# Patient Record
Sex: FEMALE | Race: BLACK OR AFRICAN AMERICAN | NOT HISPANIC OR LATINO | URBAN - METROPOLITAN AREA
[De-identification: names, ages, dates, MRNs, and addresses within clinical notes are randomized per-mention and may not be internally consistent; named-entity substitution may affect disease eponyms.]

---

## 2020-08-16 ENCOUNTER — EMERGENCY (EMERGENCY)
Facility: HOSPITAL | Age: 50
LOS: 1 days | Discharge: HOME | End: 2020-08-16
Attending: STUDENT IN AN ORGANIZED HEALTH CARE EDUCATION/TRAINING PROGRAM
Payer: COMMERCIAL

## 2020-08-16 VITALS
OXYGEN SATURATION: 97 % | DIASTOLIC BLOOD PRESSURE: 85 MMHG | HEART RATE: 67 BPM | TEMPERATURE: 99 F | HEIGHT: 61 IN | WEIGHT: 149.03 LBS | RESPIRATION RATE: 18 BRPM | SYSTOLIC BLOOD PRESSURE: 137 MMHG

## 2020-08-16 PROCEDURE — 99282 EMERGENCY DEPT VISIT SF MDM: CPT | Mod: 25

## 2020-08-16 PROCEDURE — 12001 RPR S/N/AX/GEN/TRNK 2.5CM/<: CPT

## 2020-08-16 PROCEDURE — 99283 EMERGENCY DEPT VISIT LOW MDM: CPT | Mod: 25

## 2020-08-16 PROCEDURE — 12001 RPR S/N/AX/GEN/TRNK 2.5CM/<: CPT | Mod: F4

## 2020-08-16 NOTE — ED PROVIDER NOTE - PATIENT PORTAL LINK FT
You can access the FollowMyHealth Patient Portal offered by [facility] by registering at the following website: [email or web address]. By joining [employer]’s FollowMyHealth portal, you will also be able to view your health information using other applications (apps) compatible with our system.

## 2020-08-16 NOTE — ED PROVIDER NOTE - PHYSICAL EXAMINATION
Vital signs noted, see flowsheet.  General: Well nourished/developed. In no acute distress, well appearing and non-toxic.  HEENT: Moist mucous membranes.   Cardiac: Regular rate and rhythm. +S1/S2. Peripheral pulses 2+ and symmetric b/l.  Respiratory: Speaking in full sentences, no evidence of respiratory distress. Lungs clear to ascultation b/l  Skin: 0.5 cm avulsion to left 5th distal pinky with small part of nail missing, no nailbed laceration, minimally oozing blood.   LEFT UE: Non tender to palpation, no swelling, sensation intact, + FROM, peripheral pulses 2+ b/l. Capillary refill less than 2 seconds.   Neuro: Awake, alert and oriented to person/place/time/situation. Moves all extremities spontaneously and symmetrically.

## 2020-08-16 NOTE — ED PROCEDURE NOTE - CPROC ED LACER REPAIR DETAIL1
No foreign body/SKIN AVULSION to left 5th pinky, minimal ooze of blood, controlled with pressure, hemostasis achieved/The wound was explored to base in bloodless field.

## 2020-08-16 NOTE — ED PROVIDER NOTE - OBJECTIVE STATEMENT
50 y/o F with PMHx asthma presents to ED c/o accidental laceration occurring today. Pt reports she was peeling potatoes when she accidentally cut her left pinky finger with ameya. Pt covered wound with tissue. Pt is right handed. No other injuries. Denies use of blood thinners. Last known TDAP less than 10 years ago

## 2020-08-16 NOTE — ED PROVIDER NOTE - CLINICAL SUMMARY MEDICAL DECISION MAKING FREE TEXT BOX
50 y/o F with PMHx asthma presents to ED c/o accidental laceration occurring today. On exam has left 5th digit distal skin avulsion with some ooze of blood. UTD TDAP  -Will apply pressure to control bleeding, repair

## 2020-08-16 NOTE — ED PROVIDER NOTE - NSFOLLOWUPINSTRUCTIONS_ED_ALL_ED_FT
- Please follow up with your Primary Care Doctor in 24-48 hr.  - Seek immediate medical care for any new, worsening or concerning signs or symptoms.     Feel better!   Laceration    A laceration is a cut that goes through all of the layers of the skin and into the tissue that is right under the skin. Some lacerations heal on their own. Others need to be closed with skin adhesive strips, skin glue, stitches (sutures), or staples. Proper laceration care minimizes the risk of infection and helps the laceration to heal better.  If non-absorbable stitches or staples have been placed, they must be taken out within the time frame instructed by your healthcare provider.    SEEK IMMEDIATE MEDICAL CARE IF YOU HAVE ANY OF THE FOLLOWING SYMPTOMS: swelling around the wound, worsening pain, drainage from the wound, red streaking going away from your wound, inability to move finger or toe near the laceration, or discoloration of skin near the laceration.

## 2020-08-16 NOTE — ED PROVIDER NOTE - CHPI ED SYMPTOMS NEG
no chills/no red streaks/no fever/no purulent drainage/no drainage/no redness/no vomiting/no numbness, no weakness

## 2020-08-16 NOTE — ED PROVIDER NOTE - PROGRESS NOTE DETAILS
PAULINA Doe NOTE: Hemostasis achieved, wound covered with tissue adhesive. Return for worsening sxs.  Pt stable for d/c, reports improvement, VSS, tolerating PO, ambulatory.  Discussion includes results, plan, and return precautions. Pt advised to f/u with PMD 1-2 days. Pt verbalized understanding/agreement of plan.

## 2023-12-19 ENCOUNTER — EMERGENCY (EMERGENCY)
Facility: HOSPITAL | Age: 53
LOS: 1 days | Discharge: HOME | End: 2023-12-19
Attending: EMERGENCY MEDICINE
Payer: COMMERCIAL

## 2023-12-19 VITALS
OXYGEN SATURATION: 97 % | TEMPERATURE: 99 F | HEIGHT: 61 IN | WEIGHT: 149.91 LBS | HEART RATE: 61 BPM | RESPIRATION RATE: 18 BRPM | DIASTOLIC BLOOD PRESSURE: 84 MMHG | SYSTOLIC BLOOD PRESSURE: 156 MMHG

## 2023-12-19 PROBLEM — J45.909 UNSPECIFIED ASTHMA, UNCOMPLICATED: Chronic | Status: ACTIVE | Noted: 2020-08-16

## 2023-12-19 PROCEDURE — 70450 CT HEAD/BRAIN W/O DYE: CPT | Mod: 26,MA

## 2023-12-19 PROCEDURE — 99284 EMERGENCY DEPT VISIT MOD MDM: CPT | Mod: 25

## 2023-12-19 PROCEDURE — 90715 TDAP VACCINE 7 YRS/> IM: CPT

## 2023-12-19 PROCEDURE — 72125 CT NECK SPINE W/O DYE: CPT | Mod: MA

## 2023-12-19 PROCEDURE — 90471 IMMUNIZATION ADMIN: CPT

## 2023-12-19 PROCEDURE — 12001 RPR S/N/AX/GEN/TRNK 2.5CM/<: CPT

## 2023-12-19 PROCEDURE — 70450 CT HEAD/BRAIN W/O DYE: CPT | Mod: MA

## 2023-12-19 PROCEDURE — 72125 CT NECK SPINE W/O DYE: CPT | Mod: 26,MA

## 2023-12-19 RX ORDER — TETANUS TOXOID, REDUCED DIPHTHERIA TOXOID AND ACELLULAR PERTUSSIS VACCINE, ADSORBED 5; 2.5; 8; 8; 2.5 [IU]/.5ML; [IU]/.5ML; UG/.5ML; UG/.5ML; UG/.5ML
0.5 SUSPENSION INTRAMUSCULAR ONCE
Refills: 0 | Status: COMPLETED | OUTPATIENT
Start: 2023-12-19 | End: 2023-12-19

## 2023-12-19 RX ORDER — ACETAMINOPHEN 500 MG
650 TABLET ORAL ONCE
Refills: 0 | Status: COMPLETED | OUTPATIENT
Start: 2023-12-19 | End: 2023-12-19

## 2023-12-19 RX ADMIN — Medication 650 MILLIGRAM(S): at 03:20

## 2023-12-19 RX ADMIN — TETANUS TOXOID, REDUCED DIPHTHERIA TOXOID AND ACELLULAR PERTUSSIS VACCINE, ADSORBED 0.5 MILLILITER(S): 5; 2.5; 8; 8; 2.5 SUSPENSION INTRAMUSCULAR at 03:21

## 2023-12-19 RX ADMIN — Medication 650 MILLIGRAM(S): at 01:24

## 2023-12-19 NOTE — ED PROVIDER NOTE - NSFOLLOWUPCLINICS_GEN_ALL_ED_FT
[facility]  Internal Medicine  [address] [place] [address]  Phone: [phone]  Fax: [phone]     [facility]  Internal Medicine  [address] [place] [address]  Phone: [phone]  Fax: [phone]

## 2023-12-19 NOTE — ED PROVIDER NOTE - CLINICAL SUMMARY MEDICAL DECISION MAKING FREE TEXT BOX
CT HEAD:  No intracranial bleed, mass effect or depressed skull fracture.  CT CERVICAL SPINE: No acute fracture or acute subluxation.    Pt is well appearing, has no new complaints and able to walk with normal gait. Pt is stable for discharge and follow up with medical doctor. Pt educated on care and need for follow up. Discussed anticipatory guidance and return precautions. Questions answered. I had a detailed discussion with the patient regarding the historical points, exam findings, and any diagnostic results supporting the discharge diagnosis. CT HEAD reported:  No intracranial bleed, mass effect or depressed skull fracture.  CT CERVICAL SPINE reported: No acute fracture or acute subluxation.    Pt is well appearing, has no new complaints and able to walk with normal gait. Pt is stable for discharge and follow up with medical doctor. Pt educated on care and need for follow up. Discussed anticipatory guidance and return precautions. Questions answered. I had a detailed discussion with the patient regarding the historical points, exam findings, and any diagnostic results supporting the discharge diagnosis.

## 2023-12-19 NOTE — ED ADULT NURSE NOTE - OBJECTIVE STATEMENT
Covering RN Tammy- Pt c/o headache, s/p fall off step stool, hitting back head, no LOC. Laceration noted to back of head. No acute distress noted.

## 2023-12-19 NOTE — ED PROVIDER NOTE - NSFOLLOWUPINSTRUCTIONS_ED_ALL_ED_FT
Staple removal in 1 week.    Take Tylenol 650mg every 6 hours for pain,    A head injury can include your scalp, face, skull, or brain and range from mild to severe. Effects can appear immediately after the injury or develop later. The effects may last a short time or be permanent. Healthcare providers may want to check your recovery over time. Treatment may change as you recover or develop new health problems from the head injury.    DISCHARGE INSTRUCTIONS:    Call your local emergency number (911 in the [place]), or have someone else call if:    You cannot be woken.    You have a seizure.    You stop responding to others or you faint.    You have blurry or double vision.    Your speech becomes slurred or confused.    You have arm or leg weakness, loss of feeling, or new problems with coordination.    Your pupils are larger than usual, or one pupil is a different size than the other.    You have blood or clear fluid coming out of your ears or nose.  Return to the emergency department if:    You have repeated or forceful vomiting.    You feel confused.    Your headache gets worse or becomes severe.    You or someone caring for you notices that you are harder to wake than usual.  Call your doctor if:    Your symptoms last longer than 6 weeks after the injury.    You have questions or concerns about your condition or care.  Medicines:    Acetaminophen decreases pain and fever. It is available without a doctor's order. Ask how much to take and how often to take it. Follow directions. Read the labels of all other medicines you are using to see if they also contain acetaminophen, or ask your doctor or pharmacist. Acetaminophen can cause liver damage if not taken correctly.    Take your medicine as directed. Contact your healthcare provider if you think your medicine is not helping or if you have side effects. Tell your provider if you are allergic to any medicine. Keep a list of the medicines, vitamins, and herbs you take. Include the amounts, and when and why you take them. Bring the list or the pill bottles to follow-up visits. Carry your medicine list with you in case of an emergency.  Self-care:    Rest or do quiet activities. Limit your time watching TV, using the computer, or doing tasks that require a lot of thinking. Slowly return to your normal activities as directed. Do not play sports or do activities that may cause you to get hit in the head. Ask your healthcare provider when you can return to sports.    Apply ice on your head for 15 to 20 minutes every hour or as directed. Use an ice pack, or put crushed ice in a plastic bag. Cover it with a towel before you apply it to your skin. Ice helps prevent tissue damage and decreases swelling and pain.    Have someone stay with you for 24 hours , or as directed. This person can monitor you for problems and call for help if needed. When you are awake, the person should ask you a few questions every few hours to see if you are thinking clearly. An example is to ask your name or address.  Prevent another head injury:    Wear a helmet that fits properly. Do this when you play sports, or ride a bike, scooter, or skateboard. Helmets help decrease your risk for a serious head injury. Talk to your healthcare provider about other ways you can protect yourself if you play sports.    Wear your seatbelt every time you are in a car. This helps lower your risk for a head injury if you are in a car accident.  Follow up with your doctor as directed: Write down your questions so you remember to ask them during your visits.

## 2023-12-19 NOTE — ED PROVIDER NOTE - PATIENT PORTAL LINK FT
You can access the FollowMyHealth Patient Portal offered by [facility] by registering at the following website: [email or web address]. By joining [employer]’s FollowMyHealth portal, you will also be able to view your health information using other applications (apps) compatible with our system. You can access the FollowMyHealth Patient Portal offered by [facility] by registering at the following website: [email or web address]. By joining [employer]’s FollowMyHealth portal, you will also be able to view your health information using other applications (apps) compatible with our system.

## 2023-12-19 NOTE — ED PROVIDER NOTE - PHYSICAL EXAMINATION
GCS 15, no raccoon eyes, no Battles sign, no scalp step off deformities.   1 cm vertical laceration to right occipital area.  No cervical, thoracic or lumbosacral midline bony deformities,  +rotation and flexion-extension of neck and truncal area intact.

## 2023-12-19 NOTE — ED PROVIDER NOTE - OBJECTIVE STATEMENT
Patient was standing on stool to reach object on high shelf and fell backwards struck back of head on pillar wall.  No loss of consciousness reported patient denies being on anticoagulants.  Patient initially seen at local urgent care and referred to ED for further evaluation.  Patient sustained 1 cm laceration to right occipital area.

## 2023-12-19 NOTE — ED ADULT NURSE NOTE - NSFALLRISKINTERV_ED_ALL_ED
Communicate fall risk and risk factors to all staff, patient, and family/Provide visual cue: yellow wristband, yellow gown, etc/Reinforce activity limits and safety measures with patient and family/Call bell, personal items and telephone in reach/Instruct patient to call for assistance before getting out of bed/chair/stretcher/Non-slip footwear applied when patient is off stretcher/[place] to call system/Physically safe environment - no spills, clutter or unnecessary equipment/Purposeful Proactive Rounding/Room/bathroom lighting operational, light cord in reach Communicate fall risk and risk factors to all staff, patient, and family/Provide visual cue: yellow wristband, yellow gown, etc/Reinforce activity limits and safety measures with patient and family/Call bell, personal items and telephone in reach/Instruct patient to call for assistance before getting out of bed/chair/stretcher/Non-slip footwear applied when patient is off stretcher/[place] to call system/Physically safe environment - no spills, clutter or unnecessary equipment/Purposeful Proactive Rounding/Room/bathroom lighting operational, light cord in reach

## 2023-12-19 NOTE — ED PROVIDER NOTE - CARE PLAN
1 Principal Discharge DX:	Head injury  Secondary Diagnosis:	Scalp laceration  Secondary Diagnosis:	Fall at home

## 2023-12-19 NOTE — ED ADULT TRIAGE NOTE - CHIEF COMPLAINT QUOTE
Pt stated she fell off a step stool couple of hours ago, hitting  the back of her head denies loc , was seen at urgent care was referred to come to the ER c/o pain to back of her neck and feeling nausea and laceration to back of her head denies being on blood thinners

## 2023-12-19 NOTE — ED ADULT NURSE NOTE - CHIEF COMPLAINT
Impression: Combined forms of age-related cataract, bilateral: H25.813. Plan: Discussed findings. Cataract accounts for patient's complaints, but surgery not recommended at this time. Update glasses Rx. Continue to monitor with annual DFE.
Impression: Puckering of macula, right eye: H35.371. Plan: Discussed findings and educated patient on condition. Surgical treatment not indicated at this time. Monitor in 1 year with DFE and OCT macula. Contact clinic sooner if any sudden worsening of vision.
Impression: Vitreous degeneration, bilateral: H43.813. Plan: Discussed findings: retina intact OU today. Reviewed signs and symptoms of retinal tear and detachment and patient educated to RTC STAT if experiencing.
The patient is a 53y Female complaining of fall from height.

## 2023-12-20 NOTE — ED PROCEDURE NOTE - CPROC ED TIME OUT STATEMENT1
“Patient's name, , procedure and correct site were confirmed during the [facility] Timeout.” “Patient's name, , procedure and correct site were confirmed during the [facility] Timeout.”

## 2025-02-04 NOTE — ED PROVIDER NOTE - ALLERGIC/IMMUNOLOGIC NEGATIVE STATEMENT, MLM
1.  CBC shows a slightly low hemoglobin of 11.9.  She does have a history of anemia which is followed by hematology.  Platelet count is significantly elevated but has already been addressed through hematology.  2.  CMP, phosphorous, TSH, parathyroid hormone are all within normal limits. no dermatitis, no environmental allergies, no food allergies, no immunosuppressive disorder, and no pruritus.